# Patient Record
Sex: FEMALE | Race: WHITE | NOT HISPANIC OR LATINO | Employment: FULL TIME | URBAN - METROPOLITAN AREA
[De-identification: names, ages, dates, MRNs, and addresses within clinical notes are randomized per-mention and may not be internally consistent; named-entity substitution may affect disease eponyms.]

---

## 2023-09-12 ENCOUNTER — APPOINTMENT (OUTPATIENT)
Dept: PRIMARY CARE | Facility: CLINIC | Age: 53
End: 2023-09-12
Payer: MEDICARE

## 2024-12-06 ENCOUNTER — APPOINTMENT (OUTPATIENT)
Dept: RADIOLOGY | Facility: HOSPITAL | Age: 54
End: 2024-12-06
Payer: COMMERCIAL

## 2024-12-06 ENCOUNTER — HOSPITAL ENCOUNTER (EMERGENCY)
Facility: HOSPITAL | Age: 54
Discharge: OTHER INSTITUTION | End: 2024-12-07
Payer: COMMERCIAL

## 2024-12-06 DIAGNOSIS — N39.0 URINARY TRACT INFECTION WITH HEMATURIA, SITE UNSPECIFIED: ICD-10-CM

## 2024-12-06 DIAGNOSIS — R31.9 URINARY TRACT INFECTION WITH HEMATURIA, SITE UNSPECIFIED: ICD-10-CM

## 2024-12-06 DIAGNOSIS — N13.2 HYDRONEPHROSIS WITH URETERAL CALCULUS: Primary | ICD-10-CM

## 2024-12-06 LAB
ALBUMIN SERPL BCP-MCNC: 4.2 G/DL (ref 3.4–5)
ALP SERPL-CCNC: 76 U/L (ref 33–110)
ALT SERPL W P-5'-P-CCNC: 11 U/L (ref 7–45)
ANION GAP SERPL CALC-SCNC: 9 MMOL/L (ref 10–20)
APPEARANCE UR: CLEAR
AST SERPL W P-5'-P-CCNC: 17 U/L (ref 9–39)
BASOPHILS # BLD AUTO: 0.02 X10*3/UL (ref 0–0.1)
BASOPHILS NFR BLD AUTO: 0.3 %
BILIRUB SERPL-MCNC: 0.6 MG/DL (ref 0–1.2)
BILIRUB UR STRIP.AUTO-MCNC: NEGATIVE MG/DL
BUN SERPL-MCNC: 24 MG/DL (ref 6–23)
CALCIUM SERPL-MCNC: 9.4 MG/DL (ref 8.6–10.3)
CHLORIDE SERPL-SCNC: 105 MMOL/L (ref 98–107)
CO2 SERPL-SCNC: 24 MMOL/L (ref 21–32)
COLOR UR: ABNORMAL
CREAT SERPL-MCNC: 1.19 MG/DL (ref 0.5–1.05)
EGFRCR SERPLBLD CKD-EPI 2021: 54 ML/MIN/1.73M*2
EOSINOPHIL # BLD AUTO: 0.11 X10*3/UL (ref 0–0.7)
EOSINOPHIL NFR BLD AUTO: 1.7 %
ERYTHROCYTE [DISTWIDTH] IN BLOOD BY AUTOMATED COUNT: 14.9 % (ref 11.5–14.5)
GLUCOSE SERPL-MCNC: 111 MG/DL (ref 74–99)
GLUCOSE UR STRIP.AUTO-MCNC: NORMAL MG/DL
HCT VFR BLD AUTO: 38.1 % (ref 36–46)
HGB BLD-MCNC: 12.4 G/DL (ref 12–16)
HYALINE CASTS #/AREA URNS AUTO: ABNORMAL /LPF
IMM GRANULOCYTES # BLD AUTO: 0.02 X10*3/UL (ref 0–0.7)
IMM GRANULOCYTES NFR BLD AUTO: 0.3 % (ref 0–0.9)
KETONES UR STRIP.AUTO-MCNC: ABNORMAL MG/DL
LACTATE SERPL-SCNC: 0.7 MMOL/L (ref 0.4–2)
LEUKOCYTE ESTERASE UR QL STRIP.AUTO: ABNORMAL
LYMPHOCYTES # BLD AUTO: 1.2 X10*3/UL (ref 1.2–4.8)
LYMPHOCYTES NFR BLD AUTO: 18.4 %
MCH RBC QN AUTO: 24.6 PG (ref 26–34)
MCHC RBC AUTO-ENTMCNC: 32.5 G/DL (ref 32–36)
MCV RBC AUTO: 76 FL (ref 80–100)
MONOCYTES # BLD AUTO: 0.37 X10*3/UL (ref 0.1–1)
MONOCYTES NFR BLD AUTO: 5.7 %
MUCOUS THREADS #/AREA URNS AUTO: ABNORMAL /LPF
NEUTROPHILS # BLD AUTO: 4.81 X10*3/UL (ref 1.2–7.7)
NEUTROPHILS NFR BLD AUTO: 73.6 %
NITRITE UR QL STRIP.AUTO: NEGATIVE
NRBC BLD-RTO: 0 /100 WBCS (ref 0–0)
PH UR STRIP.AUTO: 7 [PH]
PLATELET # BLD AUTO: 282 X10*3/UL (ref 150–450)
POTASSIUM SERPL-SCNC: 4.3 MMOL/L (ref 3.5–5.3)
PROT SERPL-MCNC: 8.9 G/DL (ref 6.4–8.2)
PROT UR STRIP.AUTO-MCNC: ABNORMAL MG/DL
RBC # BLD AUTO: 5.04 X10*6/UL (ref 4–5.2)
RBC # UR STRIP.AUTO: ABNORMAL /UL
RBC #/AREA URNS AUTO: >20 /HPF
SODIUM SERPL-SCNC: 134 MMOL/L (ref 136–145)
SP GR UR STRIP.AUTO: 1.03
SQUAMOUS #/AREA URNS AUTO: ABNORMAL /HPF
UROBILINOGEN UR STRIP.AUTO-MCNC: NORMAL MG/DL
WBC # BLD AUTO: 6.5 X10*3/UL (ref 4.4–11.3)
WBC #/AREA URNS AUTO: ABNORMAL /HPF

## 2024-12-06 PROCEDURE — 51798 US URINE CAPACITY MEASURE: CPT

## 2024-12-06 PROCEDURE — 2500000004 HC RX 250 GENERAL PHARMACY W/ HCPCS (ALT 636 FOR OP/ED): Performed by: NURSE PRACTITIONER

## 2024-12-06 PROCEDURE — 2500000004 HC RX 250 GENERAL PHARMACY W/ HCPCS (ALT 636 FOR OP/ED)

## 2024-12-06 PROCEDURE — 96365 THER/PROPH/DIAG IV INF INIT: CPT | Mod: 59

## 2024-12-06 PROCEDURE — 96376 TX/PRO/DX INJ SAME DRUG ADON: CPT

## 2024-12-06 PROCEDURE — 87077 CULTURE AEROBIC IDENTIFY: CPT | Mod: PORLAB | Performed by: NURSE PRACTITIONER

## 2024-12-06 PROCEDURE — 81001 URINALYSIS AUTO W/SCOPE: CPT | Performed by: NURSE PRACTITIONER

## 2024-12-06 PROCEDURE — 96375 TX/PRO/DX INJ NEW DRUG ADDON: CPT

## 2024-12-06 PROCEDURE — 80053 COMPREHEN METABOLIC PANEL: CPT | Performed by: NURSE PRACTITIONER

## 2024-12-06 PROCEDURE — 85025 COMPLETE CBC W/AUTO DIFF WBC: CPT | Performed by: NURSE PRACTITIONER

## 2024-12-06 PROCEDURE — 36415 COLL VENOUS BLD VENIPUNCTURE: CPT | Performed by: NURSE PRACTITIONER

## 2024-12-06 PROCEDURE — 99285 EMERGENCY DEPT VISIT HI MDM: CPT | Mod: 25

## 2024-12-06 PROCEDURE — 83605 ASSAY OF LACTIC ACID: CPT | Performed by: NURSE PRACTITIONER

## 2024-12-06 PROCEDURE — 96361 HYDRATE IV INFUSION ADD-ON: CPT

## 2024-12-06 PROCEDURE — 74177 CT ABD & PELVIS W/CONTRAST: CPT | Performed by: STUDENT IN AN ORGANIZED HEALTH CARE EDUCATION/TRAINING PROGRAM

## 2024-12-06 PROCEDURE — 74177 CT ABD & PELVIS W/CONTRAST: CPT

## 2024-12-06 PROCEDURE — 2550000001 HC RX 255 CONTRASTS: Performed by: NURSE PRACTITIONER

## 2024-12-06 RX ORDER — ONDANSETRON HYDROCHLORIDE 2 MG/ML
4 INJECTION, SOLUTION INTRAVENOUS ONCE
Status: DISCONTINUED | OUTPATIENT
Start: 2024-12-06 | End: 2024-12-06

## 2024-12-06 RX ORDER — ONDANSETRON HYDROCHLORIDE 2 MG/ML
4 INJECTION, SOLUTION INTRAVENOUS ONCE
Status: COMPLETED | OUTPATIENT
Start: 2024-12-06 | End: 2024-12-06

## 2024-12-06 RX ORDER — MORPHINE SULFATE 4 MG/ML
4 INJECTION INTRAVENOUS ONCE
Status: COMPLETED | OUTPATIENT
Start: 2024-12-06 | End: 2024-12-06

## 2024-12-06 RX ORDER — CEFTRIAXONE 2 G/50ML
2 INJECTION, SOLUTION INTRAVENOUS ONCE
Status: COMPLETED | OUTPATIENT
Start: 2024-12-06 | End: 2024-12-06

## 2024-12-06 RX ORDER — KETOROLAC TROMETHAMINE 30 MG/ML
15 INJECTION, SOLUTION INTRAMUSCULAR; INTRAVENOUS ONCE
Status: COMPLETED | OUTPATIENT
Start: 2024-12-06 | End: 2024-12-06

## 2024-12-06 RX ADMIN — SODIUM CHLORIDE 1000 ML: 9 INJECTION, SOLUTION INTRAVENOUS at 18:52

## 2024-12-06 RX ADMIN — CEFTRIAXONE SODIUM 2 G: 2 INJECTION, SOLUTION INTRAVENOUS at 20:19

## 2024-12-06 RX ADMIN — MORPHINE SULFATE 4 MG: 4 INJECTION INTRAVENOUS at 18:52

## 2024-12-06 RX ADMIN — HYDROMORPHONE HYDROCHLORIDE 0.5 MG: 0.5 INJECTION, SOLUTION INTRAMUSCULAR; INTRAVENOUS; SUBCUTANEOUS at 22:12

## 2024-12-06 RX ADMIN — IOHEXOL 75 ML: 350 INJECTION, SOLUTION INTRAVENOUS at 19:51

## 2024-12-06 RX ADMIN — ONDANSETRON 4 MG: 2 INJECTION INTRAMUSCULAR; INTRAVENOUS at 18:52

## 2024-12-06 RX ADMIN — ONDANSETRON 4 MG: 2 INJECTION INTRAMUSCULAR; INTRAVENOUS at 20:19

## 2024-12-06 RX ADMIN — KETOROLAC TROMETHAMINE 15 MG: 30 INJECTION, SOLUTION INTRAMUSCULAR at 18:52

## 2024-12-06 RX ADMIN — MORPHINE SULFATE 4 MG: 4 INJECTION INTRAVENOUS at 20:08

## 2024-12-06 ASSESSMENT — COLUMBIA-SUICIDE SEVERITY RATING SCALE - C-SSRS
2. HAVE YOU ACTUALLY HAD ANY THOUGHTS OF KILLING YOURSELF?: NO
1. IN THE PAST MONTH, HAVE YOU WISHED YOU WERE DEAD OR WISHED YOU COULD GO TO SLEEP AND NOT WAKE UP?: NO
6. HAVE YOU EVER DONE ANYTHING, STARTED TO DO ANYTHING, OR PREPARED TO DO ANYTHING TO END YOUR LIFE?: NO

## 2024-12-06 ASSESSMENT — PAIN SCALES - GENERAL
PAINLEVEL_OUTOF10: 7

## 2024-12-06 ASSESSMENT — PAIN - FUNCTIONAL ASSESSMENT: PAIN_FUNCTIONAL_ASSESSMENT: 0-10

## 2024-12-06 ASSESSMENT — VISUAL ACUITY: OU: 1

## 2024-12-06 NOTE — ED PROVIDER NOTES
"    HPI   Chief Complaint   Patient presents with    Flank Pain     Pt has a hx of kidney stones. Pt feels pressure and pain on right lower back wrapping around to front.        Patient presents to the emergency department for evaluation of possible kidney stone.  Patient noticed some back pain 2 weeks ago and started increasing her oral fluids for suspicion of a kidney stone.  Last week though she did not keep up her increased oral fluid intake and she started noticing pain to migrate down to the right lower quadrant.  Today while driving, she noticed a sudden onset of right lower quadrant pain causing her to pull over.  She became very nauseated, vomiting and sweats.  She is noticing dysuria prior to the inability to urinate.  With a previous kidney stone back around 2012, she had a similar incident which she was obstructed.  She did require surgical intervention with Dr. Pacheco.  She does still have her appendix present.  She does not have a history of chronic kidney disease, diverticulitis or pyelonephritis.  She denies any syncope, measured fever, URI symptoms, chest pain, shortness of breath, black or bloody stools, hematemesis, hematuria or traumatic incident.  She took ibuprofen at 2 and vomited it then took Tylenol at 3:00 and vomited it.  Her symptoms are moderate to severe in severity and persistent nature.      History provided by:  Patient   used: No                          Kiran Coma Scale Score: 15                  Patient History   No past medical history on file.  No past surgical history on file.  No family history on file.  Social History     Tobacco Use    Smoking status: Not on file    Smokeless tobacco: Not on file   Substance Use Topics    Alcohol use: Not on file    Drug use: Not on file       Physical Exam   Visit Vitals  BP (!) 134/91   Pulse 69   Temp 36.4 °C (97.5 °F) (Temporal)   Resp 18   Ht 1.651 m (5' 5\")   Wt 77.1 kg (170 lb)   SpO2 97%   BMI 28.29 kg/m²   BSA " 1.88 m²      Physical Exam  Vitals reviewed.   Constitutional:       General: She is not in acute distress.     Appearance: She is not toxic-appearing.   HENT:      Head: Normocephalic and atraumatic.      Right Ear: Hearing normal.      Left Ear: Hearing normal.      Nose: Nose normal.      Mouth/Throat:      Lips: Pink.      Mouth: Mucous membranes are dry.   Eyes:      General: Lids are normal. Vision grossly intact. No scleral icterus.  Cardiovascular:      Rate and Rhythm: Normal rate and regular rhythm.      Pulses:           Radial pulses are 2+ on the left side.      Comments: No resting tachycardia.  Pulmonary:      Effort: Pulmonary effort is normal.      Breath sounds: Normal breath sounds.   Abdominal:      General: Bowel sounds are normal.      Palpations: Abdomen is soft.      Tenderness: There is abdominal tenderness in the right lower quadrant and suprapubic area. There is guarding. There is no right CVA tenderness or left CVA tenderness. Positive signs include McBurney's sign. Negative signs include Rovsing's sign and obturator sign.   Genitourinary:     Comments: Pelvic exam deferred   Musculoskeletal:      Cervical back: Normal range of motion and neck supple.      Comments: No midline vertebral tenderness.  No vesicular rash or outward sign of trauma.  Patient does have a old lumbar surgical incision over the midline spine that is well-appearing and nontender.   Skin:     General: Skin is warm and dry.      Capillary Refill: Capillary refill takes less than 2 seconds.      Findings: No rash or wound.   Neurological:      General: No focal deficit present.      Mental Status: She is alert and oriented to person, place, and time.      Sensory: Sensation is intact.      Motor: Motor function is intact.      Coordination: Coordination is intact.      Gait: Gait is intact.   Psychiatric:         Mood and Affect: Mood and affect normal.         Behavior: Behavior is cooperative.         Thought  Content: Thought content normal.         CT abdomen pelvis w IV contrast   Final Result   9 mm obstructing right ureterovesicular junction stone causing severe   right hydroureteronephrosis.        Mild hepatosplenomegaly.        MACRO:   None        Signed by: Monse Garza 12/6/2024 8:46 PM   Dictation workstation:   [id number]          Labs Reviewed   CBC WITH AUTO DIFFERENTIAL - Abnormal       Result Value    WBC 6.5      nRBC 0.0      RBC 5.04      Hemoglobin 12.4      Hematocrit 38.1      MCV 76 (*)     MCH 24.6 (*)     MCHC 32.5      RDW 14.9 (*)     Platelets 282      Neutrophils % 73.6      Immature Granulocytes %, Automated 0.3      Lymphocytes % 18.4      Monocytes % 5.7      Eosinophils % 1.7      Basophils % 0.3      Neutrophils Absolute 4.81      Immature Granulocytes Absolute, Automated 0.02      Lymphocytes Absolute 1.20      Monocytes Absolute 0.37      Eosinophils Absolute 0.11      Basophils Absolute 0.02     COMPREHENSIVE METABOLIC PANEL - Abnormal    Glucose 111 (*)     Sodium 134 (*)     Potassium 4.3      Chloride 105      Bicarbonate 24      Anion Gap 9 (*)     Urea Nitrogen 24 (*)     Creatinine 1.19 (*)     eGFR 54 (*)     Calcium 9.4      Albumin 4.2      Alkaline Phosphatase 76      Total Protein 8.9 (*)     AST 17      Bilirubin, Total 0.6      ALT 11     URINALYSIS WITH REFLEX CULTURE AND MICROSCOPIC - Abnormal    Color, Urine Light-Yellow      Appearance, Urine Clear      Specific Gravity, Urine 1.026      pH, Urine 7.0      Protein, Urine 20 (TRACE)      Glucose, Urine Normal      Blood, Urine 0.2 (2+) (*)     Ketones, Urine 10 (1+) (*)     Bilirubin, Urine NEGATIVE      Urobilinogen, Urine Normal      Nitrite, Urine NEGATIVE      Leukocyte Esterase, Urine 250 Rachel/µL (*)    MICROSCOPIC ONLY, URINE - Abnormal    WBC, Urine 21-50 (*)     RBC, Urine >20 (*)     Squamous Epithelial Cells, Urine 1-9 (SPARSE)      Mucus, Urine 1+      Hyaline Casts, Urine OCCASIONAL (*)    LACTATE -  Normal    Lactate 0.7      Narrative:     Venipuncture immediately after or during the administration of Metamizole may lead to falsely low results. Testing should be performed immediately prior to Metamizole dosing.   URINE CULTURE   URINALYSIS WITH REFLEX CULTURE AND MICROSCOPIC    Narrative:     The following orders were created for panel order Urinalysis with Reflex Culture and Microscopic.  Procedure                               Abnormality         Status                     ---------                               -----------         ------                     Urinalysis with Reflex C...[[id number]]  Abnormal            Final result               Extra Urine Gray Tube[[id number]]                            In process                   Please view results for these tests on the individual orders.   EXTRA URINE GRAY TUBE       No results found for this or any previous visit (from the past 4464 hours).    ED Course & MDM     Medical Decision Making  Patient presents to the emergency department for evaluation of abdominal pain and suspicion for kidney stone.  Differential diagnosis of obstructive ureteral lithiasis, pyelonephritis and appendicitis to mention a few.  Plan is for baseline labs, urinalysis, lactate and CT imaging with IV contrast.  Will provide symptom management of IV fluids, morphine, Zofran and Toradol.  Patient provides consent.        ED Course as of 12/06/24 2151   Fri Dec 06, 2024   1820 Bladder scan 33 mL. [NA]   1944 Creatinine(!): 1.19  0.59 four years ago. [NA]   1944 WBC: 6.5 [NA]   1944 Lactate: 0.7 [NA]   1957 Patient updated on lab results.  She is unsure of her last creatinine.  She reports her pain to be improved although still a 5-6 out of 10.  She is marked ready for CT and sent to the restroom for urinalysis.  She is ambulatory with a steady gait.  She reports that she follows now with [facility] for her health care.  Review of care everywhere shows a CMP from June of this year with a  BUN of 16, creatinine of 0.83 and GFR of 84. [NA]   2011 Patient had increased nausea with second dosing of morphine.  Additional Zofran ordered.  Urinalysis reflects WBCs and blood.  Will provide Rocephin while pending CT results. [NA]   2117 Care endorsed to Dr. Porter for review of results, reevaluation of the patient and urology consultation for disposition. [NA]   2150 Patient updated on results and she does have [facility] insurance.  If she does not have admission privileges at this facility or if urology is not on for the weekend, she prefers to be transferred to [facility]. [NA]      ED Course User Index  [NA] ROHIT Israel         Diagnoses as of 12/06/24 2151   Hydronephrosis with ureteral calculus          Your medication list      You have not been prescribed any medications.         Procedure  None     *This report was transcribed using voice recognition software.  Every effort was made to ensure accuracy; however, inadvertent computerized transcription errors may be present.*  ROHIT Israel  12/06/24         ROHIT Israel  12/06/24 2118       ROHIT Israel  12/06/24 2151

## 2024-12-07 ENCOUNTER — APPOINTMENT (OUTPATIENT)
Dept: CARDIOLOGY | Facility: HOSPITAL | Age: 54
End: 2024-12-07
Payer: COMMERCIAL

## 2024-12-07 VITALS
DIASTOLIC BLOOD PRESSURE: 74 MMHG | OXYGEN SATURATION: 94 % | SYSTOLIC BLOOD PRESSURE: 116 MMHG | HEART RATE: 75 BPM | WEIGHT: 170 LBS | TEMPERATURE: 97.5 F | BODY MASS INDEX: 28.32 KG/M2 | RESPIRATION RATE: 18 BRPM | HEIGHT: 65 IN

## 2024-12-07 LAB — HOLD SPECIMEN: NORMAL

## 2024-12-07 PROCEDURE — 2500000004 HC RX 250 GENERAL PHARMACY W/ HCPCS (ALT 636 FOR OP/ED): Performed by: EMERGENCY MEDICINE

## 2024-12-07 PROCEDURE — 96376 TX/PRO/DX INJ SAME DRUG ADON: CPT

## 2024-12-07 PROCEDURE — 93005 ELECTROCARDIOGRAM TRACING: CPT

## 2024-12-07 PROCEDURE — 2500000004 HC RX 250 GENERAL PHARMACY W/ HCPCS (ALT 636 FOR OP/ED)

## 2024-12-07 PROCEDURE — 96375 TX/PRO/DX INJ NEW DRUG ADDON: CPT

## 2024-12-07 RX ORDER — METOCLOPRAMIDE HYDROCHLORIDE 5 MG/ML
10 INJECTION INTRAMUSCULAR; INTRAVENOUS ONCE
Status: COMPLETED | OUTPATIENT
Start: 2024-12-07 | End: 2024-12-07

## 2024-12-07 RX ADMIN — HYDROMORPHONE HYDROCHLORIDE 0.5 MG: 0.5 INJECTION, SOLUTION INTRAMUSCULAR; INTRAVENOUS; SUBCUTANEOUS at 01:42

## 2024-12-07 RX ADMIN — METOCLOPRAMIDE 10 MG: 5 INJECTION, SOLUTION INTRAMUSCULAR; INTRAVENOUS at 02:07

## 2024-12-07 ASSESSMENT — LIFESTYLE VARIABLES
HAVE PEOPLE ANNOYED YOU BY CRITICIZING YOUR DRINKING: NO
TOTAL SCORE: 0
EVER FELT BAD OR GUILTY ABOUT YOUR DRINKING: NO
HAVE YOU EVER FELT YOU SHOULD CUT DOWN ON YOUR DRINKING: NO
EVER HAD A DRINK FIRST THING IN THE MORNING TO STEADY YOUR NERVES TO GET RID OF A HANGOVER: NO

## 2024-12-07 ASSESSMENT — PAIN SCALES - GENERAL: PAINLEVEL_OUTOF10: 9

## 2024-12-07 NOTE — ED NOTES
[facility] H5 [id number] n2n [phone] PAS eta 0600/NPO     Lukas Vargas RN  12/07/24 0650

## 2024-12-08 LAB
BACTERIA UR CULT: ABNORMAL
BACTERIA UR CULT: ABNORMAL

## 2024-12-09 ENCOUNTER — TELEPHONE (OUTPATIENT)
Dept: PHARMACY | Facility: HOSPITAL | Age: 54
End: 2024-12-09
Payer: COMMERCIAL

## 2024-12-09 NOTE — PROGRESS NOTES
EDPD Note: Lab/Chart Reviewed    Reviewed Mr./Mrs./Ms. Daisy Hampton 's chart regarding a positive urine culture/result that was taken during their recent emergency room visit. The patient was transferred to a non- facility .Therefore, I have faxed this information to Bella at [facility] at fax number [phone] .    Susceptibility data from last 90 days.  Collected Specimen Info Organism   12/06/24 Urine from Clean Catch/Voided Streptococcus agalactiae (Group B Streptococcus)       No further follow up needed from EDPD Team.     Abby Benjamin, PharmD

## 2024-12-15 LAB
ATRIAL RATE: 68 BPM
P AXIS: 74 DEGREES
PR INTERVAL: 155 MS
Q ONSET: 251 MS
QRS COUNT: 11 BEATS
QRS DURATION: 87 MS
QT INTERVAL: 400 MS
QTC CALCULATION(BAZETT): 426 MS
QTC FREDERICIA: 417 MS
R AXIS: 29 DEGREES
T AXIS: 11 DEGREES
T OFFSET: 451 MS
VENTRICULAR RATE: 68 BPM